# Patient Record
Sex: FEMALE | Race: WHITE
[De-identification: names, ages, dates, MRNs, and addresses within clinical notes are randomized per-mention and may not be internally consistent; named-entity substitution may affect disease eponyms.]

---

## 2022-07-19 PROBLEM — Z00.00 ENCOUNTER FOR PREVENTIVE HEALTH EXAMINATION: Status: ACTIVE | Noted: 2022-07-19

## 2022-07-21 ENCOUNTER — APPOINTMENT (OUTPATIENT)
Dept: SURGERY | Facility: CLINIC | Age: 47
End: 2022-07-21

## 2022-07-21 VITALS
SYSTOLIC BLOOD PRESSURE: 112 MMHG | TEMPERATURE: 97.3 F | HEIGHT: 64 IN | OXYGEN SATURATION: 97 % | HEART RATE: 85 BPM | DIASTOLIC BLOOD PRESSURE: 80 MMHG

## 2022-07-21 DIAGNOSIS — Z87.891 PERSONAL HISTORY OF NICOTINE DEPENDENCE: ICD-10-CM

## 2022-07-21 PROCEDURE — 99202 OFFICE O/P NEW SF 15 MIN: CPT

## 2022-07-22 PROBLEM — Z87.891 FORMER SMOKER: Status: ACTIVE | Noted: 2022-07-21

## 2022-07-22 RX ORDER — CIPROFLOXACIN HYDROCHLORIDE 500 MG/1
500 TABLET, FILM COATED ORAL
Qty: 10 | Refills: 0 | Status: ACTIVE | COMMUNITY
Start: 2022-06-12

## 2022-07-22 NOTE — DATA REVIEWED
[FreeTextEntry1] : Thyroid sonogram from almost 2 months ago showed 3 right sided nodules and one on the left along with one in the isthmus. The images are not available for review and the patient presents without the reports of her thyroid biopsies either.

## 2022-07-22 NOTE — ASSESSMENT
[FreeTextEntry1] : Multiple thyroid nodules as noted above. The patient will obtain for us the thyroid sonogram images on disc, and the biopsy slides, for review at this location. Most likely she will require a thyroidectomy, possibly a right hemithyroidectomy with total thyroidectomy. The details of these procedures were discussed in full, with their risks and benefits, with specific discussion being had regarding problems related to the laryngeal nerves and parathyroids.  All her questions were answered and she understands and agrees. Further management decisions will be pending the above and we will likely have her return for reevaluation prior to any final management decisions. She is happy with the assessment and plan.

## 2022-07-22 NOTE — HISTORY OF PRESENT ILLNESS
[de-identified] : The patient is referred for management of multiple thyroid nodules. She has known about some enlargement of the neck for the past 3 months, but without any dysphagia or dysphonia.  The area is occasionally sore but she notes no other local symptoms or changes. She has no prior history of thyroid disease and no family history of thyroid cancer. She has no history of significant radiation exposure in the head and neck region. An ultrasound was performed which showed multiple thyroid nodules largest of which was on the right and she had multiple thyroid biopsies recently by Dr. Somers, none of which showed malignancy.

## 2022-07-22 NOTE — PHYSICAL EXAM
[Normal Breath Sounds] : Normal breath sounds [Normal Heart Sounds] : normal heart sounds [Normal Rate and Rhythm] : normal rate and rhythm [de-identified] : somewhat obese but otherwise healthy in appearance [de-identified] : no exophthalmus [de-identified] : Visible bulge in the lower anterior neck right of the midline. There is a palpable nodule here about 4 CM in size which is soft and mobile. No cervical os or clavicular lymphadenopathy noted, no tracheal deviation appreciated.

## 2022-07-27 ENCOUNTER — LABORATORY RESULT (OUTPATIENT)
Age: 47
End: 2022-07-27

## 2022-08-18 ENCOUNTER — APPOINTMENT (OUTPATIENT)
Dept: SURGERY | Facility: CLINIC | Age: 47
End: 2022-08-18

## 2022-08-18 VITALS
WEIGHT: 170 LBS | HEIGHT: 64 IN | HEART RATE: 92 BPM | DIASTOLIC BLOOD PRESSURE: 77 MMHG | BODY MASS INDEX: 29.02 KG/M2 | SYSTOLIC BLOOD PRESSURE: 132 MMHG | OXYGEN SATURATION: 98 % | TEMPERATURE: 97 F

## 2022-08-18 PROCEDURE — 99213 OFFICE O/P EST LOW 20 MIN: CPT

## 2022-08-18 NOTE — DATA REVIEWED
[FreeTextEntry1] : Reviewed reports of recent thyroid ultrasound and in-house review of bilateral thyroid biopsies.

## 2022-08-18 NOTE — HISTORY OF PRESENT ILLNESS
[de-identified] : The patient returns for reevaluation regarding her multinodular thyroid.  She notes no new symptoms or changes and has no dysphonia or dysphagia.

## 2022-08-18 NOTE — ASSESSMENT
[FreeTextEntry1] : Multinodular thyroid, mostly on the right as noted above.  The dominant right thyroid nodule shows atypia and a papillary carcinoma is not ruled out.  Left thyroid nodules are benign on biopsy, 1 of uncertain significance, but seem confined to the upper pole.  The patient is recommended to undergo a subtotal thyroidectomy, sparing the left lower pole, but understands that a total thyroidectomy might be necessary based on findings at the time of surgery.  The procedure was discussed in full with its risks and benefits, including the possibility of problems related to the laryngeal nerves and the parathyroids.  Intraoperative nerve monitoring technology will be used.  All her questions were answered and she wishes to proceed promptly.  An appropriate surgical consent was obtained and she will contact the office for scheduling.  She understands that even with a subtotal thyroidectomy, she may still require postoperative thyroid replacement therapy, and it is advised that she consult with an endocrinologist, either on referral from her PCP, or with Dr. EDGARDO Pathak at this institution.  All her questions were answered and she is happy with the assessment and plan.

## 2022-08-18 NOTE — PHYSICAL EXAM
[de-identified] : Obese but otherwise healthy [de-identified] : No exophthalmos [de-identified] : Visible prominence of the anterior aspect of the lower right neck.  Trachea is midline.  Right thyroid significantly enlarged but not hard or fixed.  Left thyroid masses are not clearly palpable.  No palpable cervical or supraclavicular lymphadenopathy

## 2022-08-24 ENCOUNTER — NON-APPOINTMENT (OUTPATIENT)
Age: 47
End: 2022-08-24

## 2022-09-02 ENCOUNTER — OUTPATIENT (OUTPATIENT)
Dept: OUTPATIENT SERVICES | Facility: HOSPITAL | Age: 47
LOS: 1 days | Discharge: HOME | End: 2022-09-02

## 2022-09-02 VITALS
HEIGHT: 63 IN | OXYGEN SATURATION: 99 % | HEART RATE: 76 BPM | SYSTOLIC BLOOD PRESSURE: 109 MMHG | WEIGHT: 191.58 LBS | TEMPERATURE: 96 F | DIASTOLIC BLOOD PRESSURE: 68 MMHG | RESPIRATION RATE: 15 BRPM

## 2022-09-02 DIAGNOSIS — Z01.818 ENCOUNTER FOR OTHER PREPROCEDURAL EXAMINATION: ICD-10-CM

## 2022-09-02 DIAGNOSIS — Z98.891 HISTORY OF UTERINE SCAR FROM PREVIOUS SURGERY: Chronic | ICD-10-CM

## 2022-09-02 DIAGNOSIS — E04.2 NONTOXIC MULTINODULAR GOITER: ICD-10-CM

## 2022-09-02 LAB
ALBUMIN SERPL ELPH-MCNC: 5.1 G/DL — SIGNIFICANT CHANGE UP (ref 3.5–5.2)
ALP SERPL-CCNC: 65 U/L — SIGNIFICANT CHANGE UP (ref 30–115)
ALT FLD-CCNC: 40 U/L — SIGNIFICANT CHANGE UP (ref 0–41)
ANION GAP SERPL CALC-SCNC: 12 MMOL/L — SIGNIFICANT CHANGE UP (ref 7–14)
APTT BLD: 31.1 SEC — SIGNIFICANT CHANGE UP (ref 27–39.2)
AST SERPL-CCNC: 25 U/L — SIGNIFICANT CHANGE UP (ref 0–41)
BASOPHILS # BLD AUTO: 0.06 K/UL — SIGNIFICANT CHANGE UP (ref 0–0.2)
BASOPHILS NFR BLD AUTO: 0.9 % — SIGNIFICANT CHANGE UP (ref 0–1)
BILIRUB SERPL-MCNC: 0.4 MG/DL — SIGNIFICANT CHANGE UP (ref 0.2–1.2)
BUN SERPL-MCNC: 18 MG/DL — SIGNIFICANT CHANGE UP (ref 10–20)
CALCIUM SERPL-MCNC: 9.5 MG/DL — SIGNIFICANT CHANGE UP (ref 8.5–10.1)
CHLORIDE SERPL-SCNC: 100 MMOL/L — SIGNIFICANT CHANGE UP (ref 98–110)
CO2 SERPL-SCNC: 27 MMOL/L — SIGNIFICANT CHANGE UP (ref 17–32)
CREAT SERPL-MCNC: 0.8 MG/DL — SIGNIFICANT CHANGE UP (ref 0.7–1.5)
EGFR: 91 ML/MIN/1.73M2 — SIGNIFICANT CHANGE UP
EOSINOPHIL # BLD AUTO: 0.19 K/UL — SIGNIFICANT CHANGE UP (ref 0–0.7)
EOSINOPHIL NFR BLD AUTO: 2.8 % — SIGNIFICANT CHANGE UP (ref 0–8)
GLUCOSE SERPL-MCNC: 72 MG/DL — SIGNIFICANT CHANGE UP (ref 70–99)
HCT VFR BLD CALC: 42.6 % — SIGNIFICANT CHANGE UP (ref 37–47)
HGB BLD-MCNC: 13.8 G/DL — SIGNIFICANT CHANGE UP (ref 12–16)
IMM GRANULOCYTES NFR BLD AUTO: 0.4 % — HIGH (ref 0.1–0.3)
INR BLD: 1.05 RATIO — SIGNIFICANT CHANGE UP (ref 0.65–1.3)
LYMPHOCYTES # BLD AUTO: 1.73 K/UL — SIGNIFICANT CHANGE UP (ref 1.2–3.4)
LYMPHOCYTES # BLD AUTO: 25.8 % — SIGNIFICANT CHANGE UP (ref 20.5–51.1)
MCHC RBC-ENTMCNC: 28.7 PG — SIGNIFICANT CHANGE UP (ref 27–31)
MCHC RBC-ENTMCNC: 32.4 G/DL — SIGNIFICANT CHANGE UP (ref 32–37)
MCV RBC AUTO: 88.6 FL — SIGNIFICANT CHANGE UP (ref 81–99)
MONOCYTES # BLD AUTO: 0.56 K/UL — SIGNIFICANT CHANGE UP (ref 0.1–0.6)
MONOCYTES NFR BLD AUTO: 8.3 % — SIGNIFICANT CHANGE UP (ref 1.7–9.3)
NEUTROPHILS # BLD AUTO: 4.14 K/UL — SIGNIFICANT CHANGE UP (ref 1.4–6.5)
NEUTROPHILS NFR BLD AUTO: 61.8 % — SIGNIFICANT CHANGE UP (ref 42.2–75.2)
NRBC # BLD: 0 /100 WBCS — SIGNIFICANT CHANGE UP (ref 0–0)
PLATELET # BLD AUTO: 329 K/UL — SIGNIFICANT CHANGE UP (ref 130–400)
POTASSIUM SERPL-MCNC: 4.3 MMOL/L — SIGNIFICANT CHANGE UP (ref 3.5–5)
POTASSIUM SERPL-SCNC: 4.3 MMOL/L — SIGNIFICANT CHANGE UP (ref 3.5–5)
PROT SERPL-MCNC: 7.4 G/DL — SIGNIFICANT CHANGE UP (ref 6–8)
PROTHROM AB SERPL-ACNC: 12.1 SEC — SIGNIFICANT CHANGE UP (ref 9.95–12.87)
RBC # BLD: 4.81 M/UL — SIGNIFICANT CHANGE UP (ref 4.2–5.4)
RBC # FLD: 13 % — SIGNIFICANT CHANGE UP (ref 11.5–14.5)
SODIUM SERPL-SCNC: 139 MMOL/L — SIGNIFICANT CHANGE UP (ref 135–146)
WBC # BLD: 6.71 K/UL — SIGNIFICANT CHANGE UP (ref 4.8–10.8)
WBC # FLD AUTO: 6.71 K/UL — SIGNIFICANT CHANGE UP (ref 4.8–10.8)

## 2022-09-02 PROCEDURE — 93010 ELECTROCARDIOGRAM REPORT: CPT

## 2022-09-02 NOTE — H&P PST ADULT - HEART RATE (BEATS/MIN)
Left message for patient to return call to reschedule colonoscopy.   
Patient returned call. Colonoscopy has been changed to 02/27/17. Patient states he doesn't need a new packet.  
76

## 2022-09-02 NOTE — H&P PST ADULT - REASON FOR ADMISSION
Case Type: OP Block TimeSuite: [place] Kulwant Rutledge  Confirmed Surgery DateTime: 09- - 0:00PAST DateTime: 08- - 17:15Procedure: SUBTOTAL THYROIDECTOMY, POSSIBLE TOTAL THYROIDECTOMY  ERP?: NoLaterality: N/ALength of Procedure: 120 Minutes  Anesthesia Type: General

## 2022-09-02 NOTE — H&P PST ADULT - NSANTHOSAYNRD_GEN_A_CORE
No. GOKUL screening performed.  STOP BANG Legend: 0-2 = LOW Risk; 3-4 = INTERMEDIATE Risk; 5-8 = HIGH Risk

## 2022-09-02 NOTE — H&P PST ADULT - HISTORY OF PRESENT ILLNESS
46y Female presents today for presurgical testing for SUBTOTAL THYROIDECTOMY, POSSIBLE TOTAL THYROIDECTOMY. Patient reports painless left neck thyroid mass increasing in size over the course of the past year. On ultrasound patient found to have nodules on thyroid, which she states on biopsy were not all completely benign. She denies any difficulty breathing, denies any changes in her voice, or difficulty swallowing, now for scheduled procedure. She denies any pain and offers no other complaints at this time.   Patient denies any CP, palpitations, SOB, cough, or dysuria. No recent URI or UTI.  Stated exercise tolerance is FOS  GOKUL screen reviewed    Patient denies any recent personal exposure to COVID19. Denies any sick contacts. Patient denies travel within the past 30 days. Patient was instructed to quarantine until after procedure.    Anesthesia Alert  YES--Difficult Airway - CLASS IV  NO--History of neck surgery or radiation  NO--Limited ROM of neck  NO--History of Malignant hyperthermia  NO--Personal or family history of Pseudocholinesterase deficiency.  NO--Prior Anesthesia Complication  NO--Latex Allergy  NO--Loose teeth  NO--History of Rheumatoid Arthritis  NO--GOKUL  NO--Bleeding risk  NO--Other_____    Patient states that this is their complete medical history and list of medications.  Patient verbalized understanding of instructions given during this visit and was given the opportunity to ask questions and have them answered. They were instructed to follow up with their surgeon/surgeon's office with any questions regarding their procedure.

## 2022-09-10 ENCOUNTER — LABORATORY RESULT (OUTPATIENT)
Age: 47
End: 2022-09-10

## 2022-09-13 ENCOUNTER — TRANSCRIPTION ENCOUNTER (OUTPATIENT)
Age: 47
End: 2022-09-13

## 2022-09-13 ENCOUNTER — INPATIENT (INPATIENT)
Facility: HOSPITAL | Age: 47
LOS: 0 days | Discharge: HOME | End: 2022-09-14
Attending: SURGERY | Admitting: SURGERY

## 2022-09-13 ENCOUNTER — RESULT REVIEW (OUTPATIENT)
Age: 47
End: 2022-09-13

## 2022-09-13 ENCOUNTER — APPOINTMENT (OUTPATIENT)
Dept: SURGERY | Facility: HOSPITAL | Age: 47
End: 2022-09-13

## 2022-09-13 VITALS
WEIGHT: 160.06 LBS | OXYGEN SATURATION: 97 % | RESPIRATION RATE: 17 BRPM | TEMPERATURE: 98 F | HEART RATE: 74 BPM | SYSTOLIC BLOOD PRESSURE: 115 MMHG | DIASTOLIC BLOOD PRESSURE: 74 MMHG | HEIGHT: 64 IN

## 2022-09-13 DIAGNOSIS — Z98.891 HISTORY OF UTERINE SCAR FROM PREVIOUS SURGERY: Chronic | ICD-10-CM

## 2022-09-13 LAB
ANION GAP SERPL CALC-SCNC: 13 MMOL/L — SIGNIFICANT CHANGE UP (ref 7–14)
ANION GAP SERPL CALC-SCNC: 16 MMOL/L — HIGH (ref 7–14)
BLD GP AB SCN SERPL QL: SIGNIFICANT CHANGE UP
BUN SERPL-MCNC: 10 MG/DL — SIGNIFICANT CHANGE UP (ref 10–20)
BUN SERPL-MCNC: 14 MG/DL — SIGNIFICANT CHANGE UP (ref 10–20)
CALCIUM SERPL-MCNC: 8.7 MG/DL — SIGNIFICANT CHANGE UP (ref 8.4–10.5)
CALCIUM SERPL-MCNC: 8.8 MG/DL — SIGNIFICANT CHANGE UP (ref 8.4–10.5)
CHLORIDE SERPL-SCNC: 97 MMOL/L — LOW (ref 98–110)
CHLORIDE SERPL-SCNC: 99 MMOL/L — SIGNIFICANT CHANGE UP (ref 98–110)
CO2 SERPL-SCNC: 25 MMOL/L — SIGNIFICANT CHANGE UP (ref 17–32)
CO2 SERPL-SCNC: 25 MMOL/L — SIGNIFICANT CHANGE UP (ref 17–32)
CREAT SERPL-MCNC: 0.7 MG/DL — SIGNIFICANT CHANGE UP (ref 0.7–1.5)
CREAT SERPL-MCNC: 0.7 MG/DL — SIGNIFICANT CHANGE UP (ref 0.7–1.5)
EGFR: 107 ML/MIN/1.73M2 — SIGNIFICANT CHANGE UP
EGFR: 107 ML/MIN/1.73M2 — SIGNIFICANT CHANGE UP
GLUCOSE SERPL-MCNC: 148 MG/DL — HIGH (ref 70–99)
GLUCOSE SERPL-MCNC: 161 MG/DL — HIGH (ref 70–99)
POTASSIUM SERPL-MCNC: 4 MMOL/L — SIGNIFICANT CHANGE UP (ref 3.5–5)
POTASSIUM SERPL-MCNC: 4.4 MMOL/L — SIGNIFICANT CHANGE UP (ref 3.5–5)
POTASSIUM SERPL-SCNC: 4 MMOL/L — SIGNIFICANT CHANGE UP (ref 3.5–5)
POTASSIUM SERPL-SCNC: 4.4 MMOL/L — SIGNIFICANT CHANGE UP (ref 3.5–5)
SODIUM SERPL-SCNC: 137 MMOL/L — SIGNIFICANT CHANGE UP (ref 135–146)
SODIUM SERPL-SCNC: 138 MMOL/L — SIGNIFICANT CHANGE UP (ref 135–146)

## 2022-09-13 PROCEDURE — 88341 IMHCHEM/IMCYTCHM EA ADD ANTB: CPT | Mod: 26

## 2022-09-13 PROCEDURE — 60240 REMOVAL OF THYROID: CPT

## 2022-09-13 PROCEDURE — 88342 IMHCHEM/IMCYTCHM 1ST ANTB: CPT | Mod: 26

## 2022-09-13 PROCEDURE — 88305 TISSUE EXAM BY PATHOLOGIST: CPT | Mod: 26

## 2022-09-13 PROCEDURE — 12032 INTMD RPR S/A/T/EXT 2.6-7.5: CPT | Mod: 59

## 2022-09-13 RX ORDER — ONDANSETRON 8 MG/1
4 TABLET, FILM COATED ORAL ONCE
Refills: 0 | Status: DISCONTINUED | OUTPATIENT
Start: 2022-09-13 | End: 2022-09-13

## 2022-09-13 RX ORDER — SODIUM CHLORIDE 9 MG/ML
1000 INJECTION, SOLUTION INTRAVENOUS
Refills: 0 | Status: DISCONTINUED | OUTPATIENT
Start: 2022-09-13 | End: 2022-09-13

## 2022-09-13 RX ORDER — CALCIUM CARBONATE 500(1250)
1 TABLET ORAL THREE TIMES A DAY
Refills: 0 | Status: DISCONTINUED | OUTPATIENT
Start: 2022-09-13 | End: 2022-09-14

## 2022-09-13 RX ORDER — ENOXAPARIN SODIUM 100 MG/ML
40 INJECTION SUBCUTANEOUS EVERY 24 HOURS
Refills: 0 | Status: DISCONTINUED | OUTPATIENT
Start: 2022-09-13 | End: 2022-09-14

## 2022-09-13 RX ORDER — CALCITRIOL 0.5 UG/1
0.25 CAPSULE ORAL EVERY 12 HOURS
Refills: 0 | Status: DISCONTINUED | OUTPATIENT
Start: 2022-09-13 | End: 2022-09-14

## 2022-09-13 RX ORDER — HYDROMORPHONE HYDROCHLORIDE 2 MG/ML
0.5 INJECTION INTRAMUSCULAR; INTRAVENOUS; SUBCUTANEOUS
Refills: 0 | Status: DISCONTINUED | OUTPATIENT
Start: 2022-09-13 | End: 2022-09-13

## 2022-09-13 RX ORDER — METOCLOPRAMIDE HCL 10 MG
10 TABLET ORAL ONCE
Refills: 0 | Status: DISCONTINUED | OUTPATIENT
Start: 2022-09-13 | End: 2022-09-13

## 2022-09-13 RX ORDER — ACETAMINOPHEN 500 MG
650 TABLET ORAL EVERY 6 HOURS
Refills: 0 | Status: DISCONTINUED | OUTPATIENT
Start: 2022-09-13 | End: 2022-09-14

## 2022-09-13 RX ORDER — SODIUM CHLORIDE 9 MG/ML
1000 INJECTION, SOLUTION INTRAVENOUS
Refills: 0 | Status: DISCONTINUED | OUTPATIENT
Start: 2022-09-13 | End: 2022-09-14

## 2022-09-13 RX ORDER — LIOTHYRONINE SODIUM 25 UG/1
25 TABLET ORAL DAILY
Refills: 0 | Status: DISCONTINUED | OUTPATIENT
Start: 2022-09-13 | End: 2022-09-14

## 2022-09-13 RX ORDER — ONDANSETRON 8 MG/1
4 TABLET, FILM COATED ORAL EVERY 6 HOURS
Refills: 0 | Status: DISCONTINUED | OUTPATIENT
Start: 2022-09-13 | End: 2022-09-14

## 2022-09-13 RX ADMIN — ENOXAPARIN SODIUM 40 MILLIGRAM(S): 100 INJECTION SUBCUTANEOUS at 15:37

## 2022-09-13 RX ADMIN — Medication 650 MILLIGRAM(S): at 23:19

## 2022-09-13 RX ADMIN — HYDROMORPHONE HYDROCHLORIDE 0.5 MILLIGRAM(S): 2 INJECTION INTRAMUSCULAR; INTRAVENOUS; SUBCUTANEOUS at 14:31

## 2022-09-13 RX ADMIN — SODIUM CHLORIDE 75 MILLILITER(S): 9 INJECTION, SOLUTION INTRAVENOUS at 14:19

## 2022-09-13 NOTE — CHART NOTE - NSCHARTNOTEFT_GEN_A_CORE
Post Operative Check    Patient is post op from a Total thyroidectomy [52474]      Vitals    T(C): 36.8 (09-13-22 @ 15:00), Max: 36.8 (09-13-22 @ 15:00)  HR: 85 (09-13-22 @ 15:00) (74 - 91)  BP: 136/78 (09-13-22 @ 14:15) (115/74 - 154/74)  RR: 17 (09-13-22 @ 15:00) (14 - 21)  SpO2: 98% (09-13-22 @ 15:00) (96% - 98%)  Wt(kg): --      Labs  CAPILLARY BLOOD GLUCOSE        09-13    137  |  99  |  14  ----------------------------<  161<H>  4.0   |  25  |  0.7      Calcium, Total Serum: 8.7 mg/dL (09-13-22 @ 13:51)        Physical Exam  General: NAD AAOx3   Neck: no ecchymosis or edema to anterior neck, no evidence of hematoma, dressing dry   Cards: RRR S1S2,  Resp: CTAB,  symmetric chest rise, no labored breathing   Abdomen: non tender, non distended   : voiding   Ext: NTBL    Patient is a 46y old Female s/p total thyroidectomy.     Plan:   CLD, advance as tolerated   pain control   f/u serial BMPs to check calcium level- started on oral supplements for calcium replacement

## 2022-09-13 NOTE — CHART NOTE - NSCHARTNOTEFT_GEN_A_CORE
PACU ANESTHESIA ADMISSION NOTE      Procedure:   Post op diagnosis:      ____  Intubated  TV:______       Rate: ______      FiO2: ______    __x__  Patent Airway    __x__  Full return of protective reflexes    __x__  Full recovery from anesthesia / back to baseline     Vitals:        See Anesthesia Record      Mental Status:  _x___ Awake   __x___ Alert   _____ Drowsy   _____ Sedated    Nausea/Vomiting:  _x___ NO  ______Yes,   __x__ See Post - Op Orders          Pain Scale (0-10):  __0___    Treatment: ____ None    __x__ See Post - Op/PCA Orders    Post - Operative Fluids:   ____ Oral   __x__ See Post - Op Orders    Plan: Discharge:   ____Home       __x___Floor     _____Critical Care    _____  Other:_________________    Comments: Uneventful anesthesia. Patient transported to PACU fully awake and responsive, spontaneously breathing and hemodynamically stable. Report given to PACU RN at bedside PACU ANESTHESIA ADMISSION NOTE      Procedure:   Post op diagnosis:      ____  Intubated  TV:______       Rate: ______      FiO2: ______    __x__  Patent Airway    __x__  Full return of protective reflexes    __x__  Full recovery from anesthesia / back to baseline     Vitals:        See Anesthesia Record      Mental Status:  _x___ Awake   __x___ Alert   _____ Drowsy   _____ Sedated    Nausea/Vomiting:  _x___ NO  ______Yes,   __x__ See Post - Op Orders          Pain Scale (0-10):  __0___    Treatment: ____ None    __x__ See Post - Op/PCA Orders    Post - Operative Fluids:   ____ Oral   __x__ See Post - Op Orders    Plan: Discharge:   ____Home       __x___Floor     _____Critical Care    _____  Other:_________________    Comments: Uneventful anesthesia. patient was a difficult intubation, please include this in her chart for future procedures. Patient transported to PACU fully awake and responsive, spontaneously breathing and hemodynamically stable. Report given to PACU RN at bedside

## 2022-09-13 NOTE — PATIENT PROFILE ADULT - FALL HARM RISK - HARM RISK INTERVENTIONS

## 2022-09-13 NOTE — BRIEF OPERATIVE NOTE - OPERATION/FINDINGS
Nodular disease on right lobe, isthmus, and left lobe, bilateral recurrent laryngeal nerves identified intact prior to and after thyroidectomy, dry thyroid bed at termination of case

## 2022-09-14 ENCOUNTER — TRANSCRIPTION ENCOUNTER (OUTPATIENT)
Age: 47
End: 2022-09-14

## 2022-09-14 VITALS
TEMPERATURE: 98 F | DIASTOLIC BLOOD PRESSURE: 52 MMHG | SYSTOLIC BLOOD PRESSURE: 104 MMHG | OXYGEN SATURATION: 95 % | HEART RATE: 79 BPM | RESPIRATION RATE: 18 BRPM

## 2022-09-14 LAB
ANION GAP SERPL CALC-SCNC: 12 MMOL/L — SIGNIFICANT CHANGE UP (ref 7–14)
BUN SERPL-MCNC: 10 MG/DL — SIGNIFICANT CHANGE UP (ref 10–20)
CALCIUM SERPL-MCNC: 8.3 MG/DL — LOW (ref 8.4–10.5)
CHLORIDE SERPL-SCNC: 97 MMOL/L — LOW (ref 98–110)
CO2 SERPL-SCNC: 28 MMOL/L — SIGNIFICANT CHANGE UP (ref 17–32)
CREAT SERPL-MCNC: 0.7 MG/DL — SIGNIFICANT CHANGE UP (ref 0.7–1.5)
EGFR: 107 ML/MIN/1.73M2 — SIGNIFICANT CHANGE UP
GLUCOSE SERPL-MCNC: 121 MG/DL — HIGH (ref 70–99)
POTASSIUM SERPL-MCNC: 4.1 MMOL/L — SIGNIFICANT CHANGE UP (ref 3.5–5)
POTASSIUM SERPL-SCNC: 4.1 MMOL/L — SIGNIFICANT CHANGE UP (ref 3.5–5)
SODIUM SERPL-SCNC: 137 MMOL/L — SIGNIFICANT CHANGE UP (ref 135–146)

## 2022-09-14 RX ORDER — LIOTHYRONINE SODIUM 25 UG/1
1 TABLET ORAL
Qty: 21 | Refills: 0
Start: 2022-09-14 | End: 2022-10-04

## 2022-09-14 RX ORDER — CALCITRIOL 0.5 UG/1
1 CAPSULE ORAL
Qty: 42 | Refills: 0
Start: 2022-09-14 | End: 2022-10-04

## 2022-09-14 RX ORDER — ACETAMINOPHEN WITH CODEINE 300MG-30MG
1 TABLET ORAL
Qty: 20 | Refills: 0
Start: 2022-09-14 | End: 2022-09-18

## 2022-09-14 RX ORDER — SODIUM CHLORIDE 9 MG/ML
1000 INJECTION, SOLUTION INTRAVENOUS
Refills: 0 | Status: DISCONTINUED | OUTPATIENT
Start: 2022-09-14 | End: 2022-09-14

## 2022-09-14 RX ORDER — CALCIUM CARBONATE 500(1250)
1 TABLET ORAL
Qty: 63 | Refills: 0
Start: 2022-09-14 | End: 2022-10-04

## 2022-09-14 RX ADMIN — Medication 650 MILLIGRAM(S): at 06:12

## 2022-09-14 RX ADMIN — CALCITRIOL 0.25 MICROGRAM(S): 0.5 CAPSULE ORAL at 05:10

## 2022-09-14 RX ADMIN — SODIUM CHLORIDE 75 MILLILITER(S): 9 INJECTION, SOLUTION INTRAVENOUS at 08:01

## 2022-09-14 RX ADMIN — Medication 650 MILLIGRAM(S): at 05:11

## 2022-09-14 RX ADMIN — Medication 1 TABLET(S): at 05:10

## 2022-09-14 RX ADMIN — Medication 650 MILLIGRAM(S): at 00:09

## 2022-09-14 RX ADMIN — LIOTHYRONINE SODIUM 25 MICROGRAM(S): 25 TABLET ORAL at 05:10

## 2022-09-14 NOTE — DISCHARGE NOTE NURSING/CASE MANAGEMENT/SOCIAL WORK - PATIENT PORTAL LINK FT
You can access the FollowMyHealth Patient Portal offered by [facility] by registering at the following website: [email or web address]. By joining [facility]’s FollowMyHealth portal, you will also be able to view your health information using other applications (apps) compatible with our system.

## 2022-09-14 NOTE — DISCHARGE NOTE PROVIDER - HOSPITAL COURSE
Mrs. Glynn is a 45 y/o F with pmhx of multiple thyroid nodules presented electively for a subtotal, possible total thyroidectomy.   Due to multiple nodules on both lobes of thyroid, decision was made to perform a total thyroidectomy.   The patient tolerated the procedure well. She was admitted overnight for observation and serial calcium levels.     She is now tolerating a soft diet, is ambulating, voiding, and pain is controlled. She does endorse a sore throat, likely fro intubation but is not horse and has no wheezing or stridor.   Patient will follow up in the office with Dr. Rutledge this coming week. Prior to follow up visit, she will have labs drawn to check calcium and PTH levels.     Patient will be discharged home on pain medication and supplemental calcium. Script for blood work given to her  at bedside.

## 2022-09-14 NOTE — DISCHARGE NOTE PROVIDER - CARE PROVIDER_API CALL
Kulwant Rutledge)  Surgery  [address] [place] [address], [address] [place] [address]  Phone: [phone]  Fax: [phone]  Follow Up Time: 1 week

## 2022-09-14 NOTE — DISCHARGE NOTE PROVIDER - NSDCMRMEDTOKEN_GEN_ALL_CORE_FT
calcitriol 0.25 mcg oral capsule: 1 cap(s) orally every 12 hours  calcium carbonate 1250 mg (500 mg elemental calcium) oral tablet: 1 tab(s) orally 3 times a day  liothyronine 25 mcg oral tablet: 1 tab(s) orally once a day

## 2022-09-14 NOTE — DISCHARGE NOTE PROVIDER - NSDCCPCAREPLAN_GEN_ALL_CORE_FT
PRINCIPAL DISCHARGE DIAGNOSIS  Diagnosis: S/P thyroidectomy  Assessment and Plan of Treatment: You came to the hospital electively for a thyroidectomy.   You will be discharged home on supplemental calcium medications that were sent to your [facility] pharmacy. A 3 week supply of medications were sent.   ***You must follow up with an Endocrinologist for future management of thyroid hormones and calcium levels. Any medication renewels will be managed by your endocrinologist.   If you are in pain- you may take Tylenol and motrin every 4-6 hours as needed.   Pain medication was also sent to your [facility].   You may shower with dressing as it is waterproof. You may remove dressing in 48h. Leave steri strips underneath dressing in place.   You were given a paper prescription for blood work. You should have blood drawn 48 hours after discharge (Friday 9/16) and have results sent to Dr. Rutledge to review before your follow up visit.   Please call office to schedule a follow up appointment for this coming week.   If you experience severe pain, horseness, wheezing, difficulty breathing, oozing from incision, severe swelling around area, muscle cramps or twitching- call office or report to nearest Emergency department.

## 2022-09-14 NOTE — PROGRESS NOTE ADULT - ASSESSMENT
ASSESSMENT:  45 y/o F s/p total thyroidectomy    PLAN:  monitor calcium level, replete as needed  monitor wound and dressing for any changes  discharge planning  pain control  incentive spirometry  DVT/GI prophylaxis  SCDs, IS  encourage ambulation      spectra [id number]

## 2022-09-14 NOTE — PROGRESS NOTE ADULT - SUBJECTIVE AND OBJECTIVE BOX
GENERAL SURGERY PROGRESS NOTE    Patient: RACIEL SOSA , 46y (71)Female   MRN: [id number]  Location: [address]  Visit: 22 Inpatient  Date: 22 @ 01:28    Procedure/Dx/Injuries: s/p total thyroidectomy    Events of past 24 hours: no acute events. patient feels well post op, pain well controlled, she voided adequately, ambulated, calcium levels remain wnl    PAST MEDICAL & SURGICAL HISTORY:  Obesity  Multiple thyroid nodules  H/O  section  x3    Vitals:   T(F): 99 (22 @ 00:39), Max: 99.8 (22 @ 22:42)  HR: 92 (22 @ 00:39)  BP: 111/55 (22 @ 00:39)  RR: 18 (22 @ 00:39)  SpO2: 94% (22 @ 00:39)    Diet, Clear Liquid    Fluids: lactated ringers.: Solution, 1000 milliLiter(s) infuse at 100 mL/Hr    I & O's:  Bowel Movement: : [] YES [] NO  Flatus: : [] YES [] NO    PHYSICAL EXAM:  General: NAD, AAOx3, calm and cooperative  Neck: dressing in place clean, dry, intact, no drainage, bleeding or hematoma noted   Cardiac: RRR S1, S2,   Respiratory: CTAB,  Abdomen: Soft, non-distended, non-tender,   Vascular: Pulses 2+ throughout, extremities well perfused    MEDICATIONS  (STANDING):  acetaminophen     Tablet .. 650 milliGRAM(s) Oral every 6 hours  calcitriol   Capsule 0.25 MICROGram(s) Oral every 12 hours  calcium carbonate   1250 mG (OsCal) 1 Tablet(s) Oral three times a day  enoxaparin Injectable 40 milliGRAM(s) SubCutaneous every 24 hours  lactated ringers. 1000 milliLiter(s) (100 mL/Hr) IV Continuous <Continuous>  liothyronine 25 MICROGram(s) Oral daily    MEDICATIONS  (PRN):  ondansetron Injectable 4 milliGRAM(s) IV Push every 6 hours PRN Nausea      DVT PROPHYLAXIS: enoxaparin Injectable 40 milliGRAM(s) SubCutaneous every 24 hours    GI PROPHYLAXIS:   ANTICOAGULATION:   ANTIBIOTICS:      LAB/STUDIES:  Labs:  CAPILLARY BLOOD GLUCOSE        138  |  97<L>  |  10  ----------------------------<  148<H>  4.4   |  25  |  0.7      Calcium, Total Serum: 8.8 mg/dL (22 @ 21:59)      IMAGING:      ACCESS/ DEVICES:  [ x] Peripheral IV  [ ] Central Venous Line	[ ] R	[ ] L	[ ] IJ	[ ] Fem	[ ] SC	Placed:   [ ] Arterial Line		[ ] R	[ ] L	[ ] Fem	[ ] Rad	[ ] Ax	Placed:   [ ] PICC:					[ ] Mediport  [ ] Urinary Catheter,  Date Placed:   [ ] Chest tube: [ ] Right, [ ] Left  [ ] WATSON/Aram Drains

## 2022-09-17 ENCOUNTER — NON-APPOINTMENT (OUTPATIENT)
Age: 47
End: 2022-09-17

## 2022-09-17 DIAGNOSIS — E66.9 OBESITY, UNSPECIFIED: ICD-10-CM

## 2022-09-17 DIAGNOSIS — E04.2 NONTOXIC MULTINODULAR GOITER: ICD-10-CM

## 2022-09-22 ENCOUNTER — APPOINTMENT (OUTPATIENT)
Dept: SURGERY | Facility: CLINIC | Age: 47
End: 2022-09-22

## 2022-09-22 VITALS
DIASTOLIC BLOOD PRESSURE: 78 MMHG | WEIGHT: 187 LBS | HEIGHT: 64 IN | TEMPERATURE: 97 F | OXYGEN SATURATION: 97 % | BODY MASS INDEX: 31.92 KG/M2 | HEART RATE: 80 BPM | SYSTOLIC BLOOD PRESSURE: 123 MMHG

## 2022-09-22 DIAGNOSIS — E04.2 NONTOXIC MULTINODULAR GOITER: ICD-10-CM

## 2022-09-22 PROCEDURE — 99024 POSTOP FOLLOW-UP VISIT: CPT

## 2022-09-22 NOTE — HISTORY OF PRESENT ILLNESS
[de-identified] : The patient comes for her first postoperative visit following her recent total thyroidectomy.  She comes with her daughter and notes that her swallowing is much improved and she does not notice any voice changes she does complain of some peripheral tingling but has no other neuromuscular symptoms.

## 2022-09-22 NOTE — PHYSICAL EXAM
[de-identified] :   Healthy [de-identified] :  transverse collar incision is clean and dry, with no evidence of hematoma or infection.  There is some edema and ecchymosis and minimal tenderness.   [de-identified] :   Mild hyperreflexia with a slightly positive show Chvostek's sign, other DTRs are normal.

## 2022-09-22 NOTE — DATA REVIEWED
[FreeTextEntry1] :  final pathology is still pending and will be checked.\par \par Outpatient blood work shows serum PTH at less than 6 ng/mL, serum calcium is 7.6.

## 2022-09-22 NOTE — ASSESSMENT
[FreeTextEntry1] :   Operative site is clean and healing without problems.  She has postoperative hypocalcemia which is likely transient.  She will increase her oral calcium to 600 mg 4 times daily and have repeat lab work done in about 2 days.  Final pathology will be checked and discussed.  Arrangements have been made for postsurgical endocrinology evaluation with Dr. Dionte Priest and appropriate reports will be forwarded to him.  All questions answered.  Local care and activity instructions were reviewed and she will return   Here in 2 to 3 weeks for reevaluation.  We did discuss the possibility of additional thyroid scanning and possible NAVARRO should a malignancy be documented.

## 2022-09-23 ENCOUNTER — NON-APPOINTMENT (OUTPATIENT)
Age: 47
End: 2022-09-23

## 2022-09-26 LAB — SURGICAL PATHOLOGY STUDY: SIGNIFICANT CHANGE UP

## 2022-10-10 ENCOUNTER — NON-APPOINTMENT (OUTPATIENT)
Age: 47
End: 2022-10-10

## 2022-10-27 ENCOUNTER — APPOINTMENT (OUTPATIENT)
Dept: SURGERY | Facility: CLINIC | Age: 47
End: 2022-10-27

## 2022-10-27 VITALS
BODY MASS INDEX: 32.78 KG/M2 | SYSTOLIC BLOOD PRESSURE: 108 MMHG | DIASTOLIC BLOOD PRESSURE: 68 MMHG | TEMPERATURE: 97.7 F | WEIGHT: 192 LBS | HEIGHT: 64 IN | OXYGEN SATURATION: 97 % | HEART RATE: 91 BPM

## 2022-10-27 PROCEDURE — 99024 POSTOP FOLLOW-UP VISIT: CPT

## 2022-10-27 RX ORDER — LEVOTHYROXINE SODIUM 125 UG/1
125 TABLET ORAL
Qty: 30 | Refills: 0 | Status: ACTIVE | COMMUNITY
Start: 2022-10-23

## 2022-10-27 RX ORDER — CALCITRIOL 0.25 UG/1
0.25 CAPSULE, LIQUID FILLED ORAL
Qty: 90 | Refills: 0 | Status: ACTIVE | COMMUNITY
Start: 2022-10-21

## 2022-10-27 NOTE — ASSESSMENT
[FreeTextEntry1] :   Persistent and somewhat symptomatic hypocalcemia post total thyroidectomy, even though the resected specimen did not show any parathyroid tissue.  Hopefully this will be transient.  Range of motion exercises and local care were discussed for her neck and she will return in about 2 to 3 months for reevaluation.  She will be seeing the endocrinologist's again within the next few weeks and will discuss the further management of her   resected thyroid cancer.   All questions answered

## 2022-10-27 NOTE — HISTORY OF PRESENT ILLNESS
[de-identified] : The patient returns for continued postoperative follow-up following her recent total thyroidectomy that did show a papillary thyroid cancer.  She has been seen by her endocrinologist but has not yet done any evaluation regarding possible NAVARRO therapy.  She does not report any voice change and does not have any dysphagia, although she does have some peripheral paresthesias at times.  She has been increased to 4 g of calcium per day, calcitriol and vitamin D3 3 times daily.  Her Synthroid dose  is 0.125 mg/day.  She does note some neck stiffness but has no other complaints.

## 2022-10-27 NOTE — PHYSICAL EXAM
[de-identified] :   Healthy [de-identified] :  transverse collar incision is clean and dry, no hematoma or infection still some edema of the lower flap centrally.  No new masses or adenopathy [de-identified] :  mildly positive show Chvostek's sign bilaterally, no peripheral hyperreflexia.

## 2023-01-26 ENCOUNTER — APPOINTMENT (OUTPATIENT)
Dept: SURGERY | Facility: CLINIC | Age: 48
End: 2023-01-26
Payer: COMMERCIAL

## 2023-01-26 VITALS
OXYGEN SATURATION: 96 % | DIASTOLIC BLOOD PRESSURE: 78 MMHG | BODY MASS INDEX: 32.78 KG/M2 | TEMPERATURE: 97.3 F | HEART RATE: 101 BPM | WEIGHT: 192 LBS | SYSTOLIC BLOOD PRESSURE: 120 MMHG | HEIGHT: 64 IN

## 2023-01-26 PROBLEM — E04.2 NONTOXIC MULTINODULAR GOITER: Chronic | Status: ACTIVE | Noted: 2022-09-02

## 2023-01-26 PROBLEM — E66.9 OBESITY, UNSPECIFIED: Chronic | Status: ACTIVE | Noted: 2022-09-02

## 2023-01-26 PROCEDURE — 99212 OFFICE O/P EST SF 10 MIN: CPT

## 2023-01-26 NOTE — ASSESSMENT
[FreeTextEntry1] :  sonogram and clinical exam are negative for any evidence of thyroid carcinoma.  She has persistent hypoparathyroidism and will have results of her most recent blood work from the endocrinologist forwarded here.  She will return here in 4 months for reexam or sooner as needed.  We will plan for a chest x-ray at her 1 year raghu in light of the pathology findings.  She will also inform me of the results of her son's thyroid evaluation.

## 2023-01-26 NOTE — PHYSICAL EXAM
[de-identified] :   Healthy [de-identified] :  positive bilateral show Chvostek sign [de-identified] :  transverse collar incision is soft and has faded nicely.  No palpable neck masses, trachea midline.  No cervical or supraclavicular lymphadenopathy

## 2023-01-26 NOTE — HISTORY OF PRESENT ILLNESS
[de-identified] : The patient returns for continued follow-up after her total thyroidectomy which was done in September 2022.  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  She has not received any postoperative radiation therapy and is on Synthroid replacement.\par \par She continues to follow regularly with Dr. Priest and has persistent hypoparathyroidism.  She has no dysphagia or dysphonia and states that her peripheral paresthesias have improved.  She remains on supplemental calcium, vitamin D, and calcitriol 3 times daily.\par \par She also reports that her son, 20 y/o, who had multiple facial fractures after a skiing accident, was found to have a thyroid nodule found incidentally and is pending a thyroid needle biopsy by Dr. Cosme.

## 2023-01-26 NOTE — DATA REVIEWED
[FreeTextEntry1] :   Bilateral thyroid sonogram done 2 weeks ago showed no suspicious masses or adenopathy.

## 2023-05-18 ENCOUNTER — APPOINTMENT (OUTPATIENT)
Dept: SURGERY | Facility: CLINIC | Age: 48
End: 2023-05-18

## 2023-05-18 ENCOUNTER — APPOINTMENT (OUTPATIENT)
Dept: BREAST CENTER | Facility: CLINIC | Age: 48
End: 2023-05-18

## 2023-05-18 ENCOUNTER — APPOINTMENT (OUTPATIENT)
Dept: SURGERY | Facility: CLINIC | Age: 48
End: 2023-05-18
Payer: COMMERCIAL

## 2023-05-18 VITALS
OXYGEN SATURATION: 98 % | HEART RATE: 79 BPM | DIASTOLIC BLOOD PRESSURE: 82 MMHG | HEIGHT: 64 IN | WEIGHT: 192 LBS | SYSTOLIC BLOOD PRESSURE: 124 MMHG | TEMPERATURE: 97 F | BODY MASS INDEX: 32.78 KG/M2

## 2023-05-18 DIAGNOSIS — E89.2 POSTPROCEDURAL HYPOPARATHYROIDISM: ICD-10-CM

## 2023-05-18 DIAGNOSIS — C73 MALIGNANT NEOPLASM OF THYROID GLAND: ICD-10-CM

## 2023-05-18 DIAGNOSIS — E83.51 HYPOCALCEMIA: ICD-10-CM

## 2023-05-18 PROCEDURE — 99212 OFFICE O/P EST SF 10 MIN: CPT

## 2023-05-18 NOTE — PHYSICAL EXAM
[Normal Breath Sounds] : Normal breath sounds [de-identified] :   Healthy, no exophthalmos [de-identified] :  no neck masses or adenopathy noted.  Trachea midline, transverse collar incision is soft and has faded nicely

## 2023-05-18 NOTE — ASSESSMENT
[FreeTextEntry1] :  clinically BONIFACIO regarding her prior thyroid cancer.  She will continue her follow-up with the endocrinologist and return here in 6 months for reexam or sooner as needed.  A chest x-ray will be done in September at the annual interval from her total thyroidectomy.  An appropriate requisition was provided.

## 2023-05-18 NOTE — HISTORY OF PRESENT ILLNESS
[de-identified] : The patient returns for her scheduled follow-up examination, and notes no new symptoms or changes related to her total thyroidectomy.  Her hypoparathyroidism is persistent and she remains on Synthroid, calcitriol, Os-Bassem and vitamin D3.  She sees her endocrinologist every 6 weeks in this regard.  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

## 2023-05-18 NOTE — DATA REVIEWED
[FreeTextEntry1] :  most recent blood work shows a serum calcium of 7.7 and a PTH level less than 6.

## 2023-09-11 NOTE — ASU PREOP CHECKLIST - NS PREOP CHK INSULIN PUMP THERAPY
n./a Calcipotriene Counseling:  I discussed with the patient the risks of calcipotriene including but not limited to erythema, scaling, itching, and irritation.

## 2023-11-30 ENCOUNTER — APPOINTMENT (OUTPATIENT)
Dept: SURGERY | Facility: CLINIC | Age: 48
End: 2023-11-30
